# Patient Record
Sex: FEMALE | Race: WHITE | NOT HISPANIC OR LATINO
[De-identification: names, ages, dates, MRNs, and addresses within clinical notes are randomized per-mention and may not be internally consistent; named-entity substitution may affect disease eponyms.]

---

## 2023-04-13 PROBLEM — Z00.00 ENCOUNTER FOR PREVENTIVE HEALTH EXAMINATION: Status: ACTIVE | Noted: 2023-04-13

## 2023-07-05 ENCOUNTER — LABORATORY RESULT (OUTPATIENT)
Age: 53
End: 2023-07-05

## 2023-07-05 ENCOUNTER — APPOINTMENT (OUTPATIENT)
Dept: HEMATOLOGY ONCOLOGY | Facility: CLINIC | Age: 53
End: 2023-07-05
Payer: MEDICAID

## 2023-07-05 VITALS
BODY MASS INDEX: 34.4 KG/M2 | DIASTOLIC BLOOD PRESSURE: 79 MMHG | RESPIRATION RATE: 18 BRPM | TEMPERATURE: 98.2 F | OXYGEN SATURATION: 99 % | SYSTOLIC BLOOD PRESSURE: 118 MMHG | HEART RATE: 62 BPM | HEIGHT: 68 IN | WEIGHT: 227 LBS

## 2023-07-05 DIAGNOSIS — Z80.42 FAMILY HISTORY OF MALIGNANT NEOPLASM OF PROSTATE: ICD-10-CM

## 2023-07-05 DIAGNOSIS — M79.7 FIBROMYALGIA: ICD-10-CM

## 2023-07-05 PROCEDURE — 99203 OFFICE O/P NEW LOW 30 MIN: CPT | Mod: 25

## 2023-07-05 RX ORDER — TRAMADOL HYDROCHLORIDE 50 MG/1
50 TABLET, COATED ORAL
Refills: 0 | Status: ACTIVE | COMMUNITY

## 2023-07-05 NOTE — ASSESSMENT
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  Hematology evaluation requested by the ophthalmologist and PCP\par \par Plan:\par 1.  branch retinal vein occlusion\par --history reviewed with patient\par --although she has not been a smoker for many years, she was a heavy smoker for 10+ years;  also has a hx of heavy alcohol use but has cut back since the pandemic ended.  These may be risk factors for branch RVO\par --There is controversy about the contribution of thrombophilia testing to the evaluation for branch RVO.  A relatively recent meta-analysis from 2020 suggested that the laboratory eval for thrombophilia is not useful since the percentage positivity was similar to that reported for healthy subjects.  Nevertheless, we have a relatively young patient with none of the typical risk factors for RVO and she is interested in a comprehensive workup including this testing.  Therefore have ordered CBC, CMP, factor V leiden and prothrombin gene mutation testing, SPEP, and a workup to r/o antiphospholipid antibody syndrome.  \par --telehealth f/u in 2 weeks to discuss lab results (will call and cancel f/u if results normal)\par --maintain f/u with ophtho\par --urged to get up to date w/ age appropriate cancer screening - due for colorectal cancer screening\par --reduction in ETOH advised.\par \par

## 2023-07-05 NOTE — REVIEW OF SYSTEMS
[Eye Pain] : no eye pain [Red Eyes] : eyes not red [Dry Eyes] : no dryness of the eyes [Vision Problems] : vision problems [Diarrhea: Grade 0] : Diarrhea: Grade 0 [Joint Pain] : joint pain [Muscle Pain] : muscle pain [Anxiety] : anxiety [Negative] : Heme/Lymph

## 2023-07-05 NOTE — HISTORY OF PRESENT ILLNESS
[de-identified] : Danial Zuñiga is a 53 year old woman referred by Dr Jensen for a L branch retinal vein occlusion.\par \par The patient saw her eye dr for floaters (which have been a longstanding issue) and was referred to a retina specialist, Dr Jones, who diagnosed a branch retinal vein occlusion.  He advised her to undergo evaluation for hematologic causes of this, as she does not have any of the typical risk factors (DM, HTN, smoking, etc) for this condition.\par \par The patient reports her vision is stable.\par \par She has no personal or family hx of thrombotic disorders.\par Is not on hormones\par is nulliparous -no hx miscarriage or obstetric morbidity.\par \par re: age appropriate cancer screening - recent mammogram negative.  recent pap OK\par has never had colonoscopy.  PCP gave her stool cards which she has not yet completed.\par \par PMH, PSH reviewed - notable for fibromyalgia\par FMH - negative blood clots.  father w/ prostate ca.  maternal aunt had a form of blood cancer\par SH - former smoker.  Says she smoked heavily from age 13 - age 26, at some point was smoking 3 ppd before quitting.  Used to drink alcohol heavily (mainly during pandemic) but has now cut back to 1 pint per week and does not drink every day.  Denies illicit drugs, but does use CBD edibles for medical reasons (chronic pain).

## 2023-07-05 NOTE — PHYSICAL EXAM
[Normal] : affect appropriate [de-identified] : supple [de-identified] : normal RR, breathing pattern [de-identified] : normal HR [de-identified] : no edema [de-identified] : not distended

## 2023-07-06 LAB
ALBUMIN MFR SERPL ELPH: 59.3 %
ALBUMIN SERPL-MCNC: 4.3 G/DL
ALBUMIN/GLOB SERPL: 1.4 RATIO
ALPHA1 GLOB MFR SERPL ELPH: 3.9 %
ALPHA1 GLOB SERPL ELPH-MCNC: 0.3 G/DL
ALPHA2 GLOB MFR SERPL ELPH: 9.7 %
ALPHA2 GLOB SERPL ELPH-MCNC: 0.7 G/DL
B-GLOBULIN MFR SERPL ELPH: 13.2 %
B-GLOBULIN SERPL ELPH-MCNC: 1 G/DL
DEPRECATED KAPPA LC FREE/LAMBDA SER: 1.56 RATIO
GAMMA GLOB FLD ELPH-MCNC: 1 G/DL
GAMMA GLOB MFR SERPL ELPH: 13.9 %
IGA SER QL IEP: 317 MG/DL
IGG SER QL IEP: 974 MG/DL
IGM SER QL IEP: 70 MG/DL
INTERPRETATION SERPL IEP-IMP: NORMAL
KAPPA LC CSF-MCNC: 1.23 MG/DL
KAPPA LC SERPL-MCNC: 1.92 MG/DL
M PROTEIN SPEC IFE-MCNC: NORMAL
PROT SERPL-MCNC: 7.3 G/DL
PROT SERPL-MCNC: 7.3 G/DL

## 2023-07-20 ENCOUNTER — APPOINTMENT (OUTPATIENT)
Dept: HEMATOLOGY ONCOLOGY | Facility: CLINIC | Age: 53
End: 2023-07-20
Payer: MEDICAID

## 2023-07-20 LAB
DNA PLOIDY SPEC FC-IMP: NORMAL
PTR INTERP: NORMAL

## 2023-07-20 PROCEDURE — 99213 OFFICE O/P EST LOW 20 MIN: CPT | Mod: 95

## 2023-07-20 NOTE — HISTORY OF PRESENT ILLNESS
[de-identified] : Danial Zuñiga is a 53 year old woman referred by Dr Jensen for a L branch retinal vein occlusion.\par \par The patient saw her eye dr for floaters (which have been a longstanding issue) and was referred to a retina specialist, Dr Jones, who diagnosed a branch retinal vein occlusion. He advised her to undergo evaluation for hematologic causes of this, as she does not have any of the typical risk factors (DM, HTN, smoking, etc) for this condition.\par \par The patient reports her vision is stable.\par \par She has no personal or family hx of thrombotic disorders.\par Is not on hormones\par is nulliparous -no hx miscarriage or obstetric morbidity.\par \par re: age appropriate cancer screening - recent mammogram negative. recent pap OK\par has never had colonoscopy. PCP gave her stool cards which she has not yet completed.\par \par PMH, PSH reviewed - notable for fibromyalgia\par FMH - negative blood clots. father w/ prostate ca. maternal aunt had a form of blood cancer\par SH - former smoker. Says she smoked heavily from age 13 - age 26, at some point was smoking 3 ppd before quitting. Used to drink alcohol heavily (mainly during pandemic) but has now cut back to 1 pint per week and does not drink every day. Denies illicit drugs, but does use CBD edibles for medical reasons (chronic pain). \par \par workup after initial visit in early July 2023:\par factor V leiden heterozygous\par Prothrombin gene mtuation negative\par SPEP negative\par lupus anticoagulant negative\par CMP normal\par CBC normal\par antiphospholipid antibody serologies negative\par \par  [de-identified] : no new issues/problems.\par \par hx severe allergy to NSAIDS (motrin, naproxen) - throat closing\par doesn't know if she is allergic to ASpriin or not

## 2023-07-20 NOTE — ASSESSMENT
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 Hematology evaluation requested by the ophthalmologist and PCP.  Thrombophilia evaluation performed;  found to be heterozygous for the factor V leiden mutation.\par \par Plan:\par 1. branch retinal vein occlusion\par --history reviewed with patient\par --although she has not been a smoker for many years, she was a heavy smoker for 10+ years; also has a hx of heavy alcohol use but has cut back since the pandemic ended. These may be risk factors for branch RVO\par --There is controversy about the contribution of thrombophilia testing to the evaluation for branch RVO. A relatively recent meta-analysis from 2020 suggested that the laboratory eval for thrombophilia is not useful since the percentage positivity was similar to that reported for healthy subjects. Nevertheless, we have a relatively young patient with none of the typical risk factors for RVO and she is interested in a comprehensive workup including this testing. Therefore have ordered CBC, CMP, factor V leiden and prothrombin gene mutation testing, SPEP, and a workup to r/o antiphospholipid antibody syndrome. \par --discussed results of thrombophilia workup today - heterozygous for factor V leiden mutation.  This may be the cause of her branch retinal vein occlusion\par --recommend additional of aspirin 81 mg daily - she has hx of severe allergic reaction to NSAIDs and is not sure if she is allergic ASA  - advised her to see allergist for evaluation of this and possible sensitivity testing.  suggested Dr Nur;  she wants to get referral from PCP\par --maintain f/u with ophtho\par --urged to get up to date w/ age appropriate cancer screening - due for colorectal cancer screening\par --reduction in ETOH advised.\par \par 2.  heterozygous factor V leiden\par --discussed w/ patient\par --plan for BRVO as above\par --would utilize pharmacoprophylaxis for VTE in high risk situations such as hospitalization, post op\par --avoid hormonal medications since higher risk for VTE with FVL and estrogens\par \par RTC PRN

## 2023-07-20 NOTE — PHYSICAL EXAM
[Normal] : affect appropriate [de-identified] : supple [de-identified] : normal RR, breathing pattern [de-identified] : AAO x 3, speech normal

## 2024-02-22 NOTE — REASON FOR VISIT
[Follow-Up Visit] : a follow-up visit for [Home] : at home, [unfilled] , at the time of the visit. [Medical Office: ([facility], [place])___] : at the medical office located in  [Patient] : the patient

## 2024-02-28 ENCOUNTER — APPOINTMENT (OUTPATIENT)
Dept: HEMATOLOGY ONCOLOGY | Facility: CLINIC | Age: 54
End: 2024-02-28
Payer: MEDICAID

## 2024-02-28 DIAGNOSIS — H34.8392 TRIBUTARY (BRANCH) RETINAL VEIN OCCLUSION, UNSPECIFIED EYE, STABLE: ICD-10-CM

## 2024-02-28 DIAGNOSIS — D68.51 ACTIVATED PROTEIN C RESISTANCE: ICD-10-CM

## 2024-02-28 PROCEDURE — 99214 OFFICE O/P EST MOD 30 MIN: CPT

## 2024-02-28 RX ORDER — PSYLLIUM HUSK 0.4 G
CAPSULE ORAL
Refills: 0 | Status: ACTIVE | COMMUNITY

## 2024-02-28 NOTE — ASSESSMENT
[FreeTextEntry1] : Danial Zuñiga is a 53 year old female former smoker who was diagnosed with a branch retinal vein occlusion of the left eye in Spring 2023. Hematology evaluation requested by the ophthalmologist and PCP.  Thrombophilia evaluation performed;  found to be heterozygous for the factor V leiden mutation.  Plan: 1. branch retinal vein occlusion --although she has not been a smoker for many years, she was a heavy smoker for 10+ years; also has a hx of heavy alcohol use but has cut back since the pandemic ended. These may be risk factors for branch RVO --There is controversy about the contribution of thrombophilia testing to the evaluation for branch RVO. A relatively recent meta-analysis from 2020 suggested that the laboratory eval for thrombophilia is not useful since the percentage positivity was similar to that reported for healthy subjects. Nevertheless, we have a relatively young patient with none of the typical risk factors for RVO, so comprehensive testing was performed.  Included CBC, CMP, factor V leiden and prothrombin gene mutation testing, SPEP, and a workup to r/o antiphospholipid antibody syndrome.  --thrombophilia workup showed heterozygous for factor V leiden mutation.  This may be the cause of her branch retinal vein occlusion --recommended aspirin 81 mg daily.  She did not tolerate (see HPI).  she will not re-attempt this. --full anticoagulation not indicated.  Hesitant to prescribe a prescription anti-platelet - bleeding risk may exceed benefit. --suggest fish oil supplement which may have mild blood thinning properties. --maintain f/u with ophtho --urged to get up to date w/ age appropriate cancer screening - due for colorectal cancer screening - she has a colo scheduled in 3/2024 --continued reduction in ETOH advised.  2.  heterozygous factor V leiden --would utilize pharmacoprophylaxis for VTE in high risk situations such as hospitalization, post op --avoid hormonal medications since higher risk for VTE with FVL and estrogens --discussed prevention of VTE for travel.  The patient had numerous questions that she raised during this telehealth appt, all of which I answered to the best of my ability.    RTC PRN

## 2024-02-28 NOTE — HISTORY OF PRESENT ILLNESS
[de-identified] : she requested  follow up to discuss her situation further. at my suggestion she attempted to take aspirin 81 mg but did not tolerate.  Had dyspepsia, "brain felt tingling", tinnitus, felt "horrible" tried twice and same symptoms both times. she "passed" an aspirin challenge w/ an allergist but nevertheless failed to tolerate.  Her vision is OK - nothing worse or different. She is anxious about her risk of clots. [de-identified] : Danial Zuñiga is a 53 year old woman referred by Dr Jensen for a L branch retinal vein occlusion.  The patient saw her eye dr for floaters (which have been a longstanding issue) and was referred to a retina specialist, Dr Jones, who diagnosed a branch retinal vein occlusion. He advised her to undergo evaluation for hematologic causes of this, as she does not have any of the typical risk factors (DM, HTN, smoking, etc) for this condition.  The patient reports her vision is stable.  She has no personal or family hx of thrombotic disorders. Is not on hormones is nulliparous -no hx miscarriage or obstetric morbidity.  re: age appropriate cancer screening - 2023 mammogram negative. 2023 pap OK has never had colonoscopy. Has one scheduled for 3/2024  PMH, PSH reviewed - notable for fibromyalgia FMH - negative blood clots. father w/ prostate ca. maternal aunt had a form of blood cancer SH - former smoker. Says she smoked heavily from age 13 - age 26, at some point was smoking 3 ppd before quitting. Used to drink alcohol heavily (mainly during pandemic) but has now cut back to 1 pint per week and does not drink every day. Denies illicit drugs, but does use CBD edibles for medical reasons (chronic pain).   workup after initial visit in early July 2023: factor V leiden heterozygous Prothrombin gene mtuation negative SPEP negative lupus anticoagulant negative CMP normal CBC normal antiphospholipid antibody serologies negative

## 2024-02-28 NOTE — PHYSICAL EXAM
[Normal] : affect appropriate [de-identified] : supple [de-identified] : normal RR, breathing pattern [de-identified] : AAO x 3, speech normal

## 2024-12-03 ENCOUNTER — APPOINTMENT (OUTPATIENT)
Dept: HEMATOLOGY ONCOLOGY | Facility: CLINIC | Age: 54
End: 2024-12-03
Payer: MEDICAID

## 2024-12-03 ENCOUNTER — NON-APPOINTMENT (OUTPATIENT)
Age: 54
End: 2024-12-03

## 2024-12-03 DIAGNOSIS — D68.51 ACTIVATED PROTEIN C RESISTANCE: ICD-10-CM

## 2024-12-03 PROCEDURE — 99213 OFFICE O/P EST LOW 20 MIN: CPT | Mod: 95

## 2025-03-14 ENCOUNTER — NON-APPOINTMENT (OUTPATIENT)
Age: 55
End: 2025-03-14

## 2025-04-04 ENCOUNTER — NON-APPOINTMENT (OUTPATIENT)
Age: 55
End: 2025-04-04

## 2025-05-21 ENCOUNTER — APPOINTMENT (OUTPATIENT)
Dept: ORTHOPEDIC SURGERY | Facility: CLINIC | Age: 55
End: 2025-05-21

## 2025-06-20 ENCOUNTER — NON-APPOINTMENT (OUTPATIENT)
Age: 55
End: 2025-06-20